# Patient Record
Sex: FEMALE | Race: BLACK OR AFRICAN AMERICAN | NOT HISPANIC OR LATINO
[De-identification: names, ages, dates, MRNs, and addresses within clinical notes are randomized per-mention and may not be internally consistent; named-entity substitution may affect disease eponyms.]

---

## 2017-03-28 ENCOUNTER — APPOINTMENT (OUTPATIENT)
Dept: PULMONOLOGY | Facility: CLINIC | Age: 57
End: 2017-03-28

## 2018-11-08 ENCOUNTER — APPOINTMENT (OUTPATIENT)
Dept: PULMONOLOGY | Facility: CLINIC | Age: 58
End: 2018-11-08
Payer: MEDICARE

## 2018-11-08 VITALS
BODY MASS INDEX: 23.03 KG/M2 | DIASTOLIC BLOOD PRESSURE: 78 MMHG | HEART RATE: 99 BPM | OXYGEN SATURATION: 89 % | SYSTOLIC BLOOD PRESSURE: 146 MMHG | WEIGHT: 130 LBS

## 2018-11-08 PROCEDURE — 99214 OFFICE O/P EST MOD 30 MIN: CPT

## 2018-11-27 ENCOUNTER — APPOINTMENT (OUTPATIENT)
Dept: PULMONOLOGY | Facility: CLINIC | Age: 58
End: 2018-11-27
Payer: MEDICARE

## 2018-11-27 VITALS
HEART RATE: 84 BPM | SYSTOLIC BLOOD PRESSURE: 118 MMHG | OXYGEN SATURATION: 90 % | WEIGHT: 132 LBS | DIASTOLIC BLOOD PRESSURE: 86 MMHG | BODY MASS INDEX: 23.38 KG/M2

## 2018-11-27 DIAGNOSIS — R94.2 ABNORMAL RESULTS OF PULMONARY FUNCTION STUDIES: ICD-10-CM

## 2018-11-27 PROCEDURE — 94729 DIFFUSING CAPACITY: CPT

## 2018-11-27 PROCEDURE — 94727 GAS DIL/WSHOT DETER LNG VOL: CPT

## 2018-11-27 PROCEDURE — 94060 EVALUATION OF WHEEZING: CPT

## 2018-11-27 PROCEDURE — 99214 OFFICE O/P EST MOD 30 MIN: CPT | Mod: 25

## 2018-12-02 PROBLEM — R94.2 DECREASED DIFFUSION CAPACITY OF LUNG: Status: ACTIVE | Noted: 2018-12-02

## 2019-02-26 ENCOUNTER — APPOINTMENT (OUTPATIENT)
Dept: PULMONOLOGY | Facility: CLINIC | Age: 59
End: 2019-02-26

## 2020-04-08 ENCOUNTER — APPOINTMENT (OUTPATIENT)
Dept: PULMONOLOGY | Facility: CLINIC | Age: 60
End: 2020-04-08
Payer: MEDICAID

## 2020-04-08 VITALS
WEIGHT: 137 LBS | TEMPERATURE: 98.4 F | DIASTOLIC BLOOD PRESSURE: 90 MMHG | BODY MASS INDEX: 24.27 KG/M2 | OXYGEN SATURATION: 96 % | SYSTOLIC BLOOD PRESSURE: 148 MMHG | HEART RATE: 82 BPM

## 2020-04-08 PROCEDURE — 94060 EVALUATION OF WHEEZING: CPT

## 2020-04-08 PROCEDURE — 99215 OFFICE O/P EST HI 40 MIN: CPT | Mod: 25

## 2020-04-08 PROCEDURE — 94727 GAS DIL/WSHOT DETER LNG VOL: CPT

## 2020-04-08 PROCEDURE — 94729 DIFFUSING CAPACITY: CPT

## 2020-04-08 RX ORDER — BUDESONIDE 0.25 MG/2ML
0.25 INHALANT ORAL
Refills: 0 | Status: ACTIVE | COMMUNITY

## 2020-04-08 RX ORDER — MONTELUKAST 10 MG/1
10 TABLET, FILM COATED ORAL
Refills: 0 | Status: ACTIVE | COMMUNITY

## 2020-04-08 RX ORDER — IPRATROPIUM BROMIDE AND ALBUTEROL SULFATE 2.5; .5 MG/3ML; MG/3ML
0.5-2.5 (3) SOLUTION RESPIRATORY (INHALATION)
Refills: 0 | Status: ACTIVE | COMMUNITY

## 2020-04-08 NOTE — PROCEDURE
[FreeTextEntry1] : Cardiac cath 3/4/15 - negative for CAD ([facility]). EF 55%. \par \par Echocardiogram - 3/27/15: Mild to moderate LV dysfunction. EF of 45%. Hypokinetic infero-lateral wall. Normal RV size and function. \par \par PFT 11/27/18:  Mixed obstructive/restrictive lung disease with a bronchodilator response. Diffusion is decreased.\par \par PFT - 4/8/20: Severe obstruction. Moderate restriction. Moderate reduction in diffusion. Mild response to bronchodilator. \par \par Chest x-ray - 12/29/19: [facility]. Increased markings bilaterally. No effusion. Heart size was normal.

## 2020-04-08 NOTE — HISTORY OF PRESENT ILLNESS
[Former] : former [TextBox_15] : 2000 [FreeTextEntry1] : Presented with dyspnea on exertion, cough and chest congestion. No chest pain, pressure or palpitation. No fever, chills or sweats. No sick contacts. \par \sigrid Has been using her nebulizer twice a day. Uses DuoNeb and budesonide. Also has been on Symbicort. \par \par She was in [facility] in January. \par \sigrid Stopped smoking in 2000.

## 2020-04-08 NOTE — DISCUSSION/SUMMARY
[FreeTextEntry1] : She is a 59 year old, former smoker, with a history of hypertension, cardiomyopathy and severe COPD. PFT shows combined obstructive and restrictive lung disease. Has not had CT of the Chest as of yet. \par \par Her COPD is not well controlled. Given short burst of prednisone. Placed on Trelegy. Continue with nebulizer for now. Chest CT requested to R/O and ILD. She should follow up with Cardiology. \par \par Follow up in in one month.

## 2020-04-08 NOTE — PHYSICAL EXAM
[No Acute Distress] : no acute distress [No Neck Mass] : no neck mass [Normal S1, S2] : normal s1, s2 [Rhonchi] : rhonchi [No HSM] : no hsm [Normal Gait] : normal gait [No Clubbing] : no clubbing [No Edema] : no edema [Normal Turgor] : normal turgor [No Focal Deficits] : no focal deficits [Oriented x3] : oriented x3 [Normal Affect] : normal affect

## 2020-04-08 NOTE — REVIEW OF SYSTEMS
[Cough] : cough [Wheezing] : wheezing [SOB on Exertion] : sob on exertion [Fever] : no fever [Fatigue] : no fatigue [Chills] : no chills [Nasal Congestion] : no nasal congestion [Hemoptysis] : no hemoptysis [Sputum] : no sputum [Chest Discomfort] : no chest discomfort [Edema] : no edema [Palpitations] : no palpitations [Nasal Discharge] : no nasal discharge [GERD] : no gerd [Myalgias] : no myalgias [Anemia] : no anemia [Headache] : no headache [Depression] : no depression [Diabetes] : no diabetes [Thyroid Problem] : no thyroid problem

## 2020-04-17 ENCOUNTER — APPOINTMENT (OUTPATIENT)
Dept: PULMONOLOGY | Facility: CLINIC | Age: 60
End: 2020-04-17

## 2020-05-13 ENCOUNTER — APPOINTMENT (OUTPATIENT)
Dept: PULMONOLOGY | Facility: CLINIC | Age: 60
End: 2020-05-13
Payer: MEDICAID

## 2020-05-13 VITALS — HEART RATE: 81 BPM | OXYGEN SATURATION: 97 %

## 2020-05-13 VITALS
WEIGHT: 147 LBS | TEMPERATURE: 98.5 F | SYSTOLIC BLOOD PRESSURE: 146 MMHG | DIASTOLIC BLOOD PRESSURE: 88 MMHG | OXYGEN SATURATION: 91 % | BODY MASS INDEX: 26.04 KG/M2 | HEART RATE: 87 BPM

## 2020-05-13 PROCEDURE — 99214 OFFICE O/P EST MOD 30 MIN: CPT

## 2020-05-13 RX ORDER — PREDNISONE 20 MG/1
20 TABLET ORAL DAILY
Qty: 5 | Refills: 1 | Status: DISCONTINUED | COMMUNITY
Start: 2020-04-08 | End: 2020-05-13

## 2020-05-13 NOTE — PHYSICAL EXAM
[No Neck Mass] : no neck mass [No Acute Distress] : no acute distress [Normal S1, S2] : normal s1, s2 [No HSM] : no hsm [Rhonchi] : rhonchi [Normal Gait] : normal gait [No Clubbing] : no clubbing [No Edema] : no edema [Normal Turgor] : normal turgor [No Focal Deficits] : no focal deficits [Oriented x3] : oriented x3 [Normal Affect] : normal affect [No Resp Distress] : no resp distress [No Acc Muscle Use] : no acc muscle use [TextBox_68] : Rhonchi and scattered wheezes

## 2020-05-13 NOTE — HISTORY OF PRESENT ILLNESS
[Former] : former [YearQuit] : 2000 [FreeTextEntry1] : The patient came for a follow up visit today. \par \par Feels congested. The Trelegy makes her breath harder. Off Symbicort. Ran out of albuterol. Had Ventolin. \par \par She was in [facility] in January. \par \par Stopped smoking in 2000.

## 2020-05-13 NOTE — PROCEDURE
[FreeTextEntry1] : Cardiac cath 3/4/15 - negative for CAD ([facility]). EF 55%. \par \par Echocardiogram - 3/27/15: Mild to moderate LV dysfunction. EF of 45%. Hypokinetic infero-lateral wall. Normal RV size and function. \par \par PFT 11/27/18:  Mixed obstructive/restrictive lung disease with a bronchodilator response. Diffusion is decreased.\par \par PFT - 4/8/20: Severe obstruction. Moderate restriction. Moderate reduction in diffusion. Mild response to bronchodilator. \par \par Chest x-ray - 12/29/19: [place] [facility] [place] Increased markings bilaterally. No effusion. Heart size was normal.\par \par CT Chest - 4/24/20: Granulomatous disease. Moderate pulmonary scatting with traction bronchiectasis. Hepatic granulomas as well.

## 2020-05-13 NOTE — DISCUSSION/SUMMARY
[FreeTextEntry1] : She is a 60 year old, former smoker, with a history of hypertension, cardiomyopathy and severe COPD. PFT shows combined obstructive and restrictive lung disease. CT 4/24/20 showed granulomatous disease with scarring and bronchiectasis. \par \par Her COPD is not well controlled. She is still active and she is still wheezing. Given prednisone and another course of antibiotics. \par \par To continue with Trelegy and albuterol as needed. Albuterol renewed. \par \par Follow up with Dr. Araya in one month.

## 2020-05-13 NOTE — REVIEW OF SYSTEMS
[Cough] : cough [Wheezing] : wheezing [SOB on Exertion] : sob on exertion [Fever] : no fever [Fatigue] : no fatigue [Nasal Congestion] : no nasal congestion [Hemoptysis] : no hemoptysis [Chest Tightness] : no chest tightness [Sputum] : no sputum [Chest Discomfort] : no chest discomfort [Edema] : no edema [Palpitations] : no palpitations [Nasal Discharge] : no nasal discharge [GERD] : no gerd [Myalgias] : no myalgias [Anemia] : no anemia [Headache] : no headache [Depression] : no depression [Diabetes] : no diabetes [Thyroid Problem] : no thyroid problem

## 2020-06-10 ENCOUNTER — APPOINTMENT (OUTPATIENT)
Dept: PULMONOLOGY | Facility: CLINIC | Age: 60
End: 2020-06-10
Payer: MEDICARE

## 2020-06-10 VITALS
SYSTOLIC BLOOD PRESSURE: 150 MMHG | TEMPERATURE: 98 F | WEIGHT: 148 LBS | DIASTOLIC BLOOD PRESSURE: 86 MMHG | OXYGEN SATURATION: 95 % | HEART RATE: 83 BPM | BODY MASS INDEX: 26.22 KG/M2

## 2020-06-10 PROCEDURE — 99214 OFFICE O/P EST MOD 30 MIN: CPT

## 2020-06-10 RX ORDER — AZITHROMYCIN 250 MG/1
250 TABLET, FILM COATED ORAL
Qty: 1 | Refills: 1 | Status: COMPLETED | COMMUNITY
Start: 2020-05-13 | End: 2020-06-10

## 2020-06-10 RX ORDER — PREDNISONE 10 MG/1
10 TABLET ORAL
Qty: 60 | Refills: 1 | Status: COMPLETED | COMMUNITY
Start: 2020-05-13 | End: 2020-06-10

## 2020-06-10 NOTE — PROCEDURE
[FreeTextEntry1] : Cardiac cath 3/4/15 - negative for CAD ([facility]). EF 55%. \par \par Echocardiogram - 3/27/15: Mild to moderate LV dysfunction. EF of 45%. Hypokinetic infero-lateral wall. Normal RV size and function. \par \par PFT 11/27/18:  Mixed obstructive/restrictive lung disease with a bronchodilator response. Diffusion is decreased.\par \par PFT - 4/8/20: Severe obstruction. Moderate restriction. Moderate reduction in diffusion. Mild response to bronchodilator. \par \par Chest x-ray - 12/29/19: Done at [facility] [place]. Increased markings bilaterally. No effusion. Heart size was normal.\par \par CT Chest - 4/24/20: Granulomatous disease. Moderate pulmonary scatting with traction bronchiectasis. Hepatic granulomas as well.

## 2020-06-10 NOTE — DISCUSSION/SUMMARY
[FreeTextEntry1] : She is a 60 year old, former smoker, with a history of hypertension, cardiomyopathy and severe COPD. PFT showed combined obstructive and restrictive lung disease. CT 4/24/20 showed granulomatous disease with scarring and bronchiectasis possibly due to Sarcoid.\par \par She is now stable. Advised to continue with Trelegy and albuterol as needed.\par \par Will review hospital records. \par \par Follow up in three months. Sooner if necessary. \par

## 2020-06-10 NOTE — HISTORY OF PRESENT ILLNESS
[Former] : former [YearQuit] : 2000 [FreeTextEntry1] : She was in [facility] a few weeks ago for edema of the legs. \par \par Feels better now. \par \par On Trelegy and Ventolin. \par \par She was in [facility] in January. \par \par Stopped smoking in 2000.

## 2020-06-10 NOTE — REVIEW OF SYSTEMS
[Cough] : cough [Wheezing] : wheezing [SOB on Exertion] : sob on exertion [Fever] : no fever [Fatigue] : no fatigue [Hemoptysis] : no hemoptysis [Nasal Congestion] : no nasal congestion [Sputum] : no sputum [Chest Tightness] : no chest tightness [Chest Discomfort] : no chest discomfort [Palpitations] : no palpitations [Edema] : no edema [Nasal Discharge] : no nasal discharge [GERD] : no gerd [Myalgias] : no myalgias [Anemia] : no anemia [Depression] : no depression [Headache] : no headache [Thyroid Problem] : no thyroid problem [Diabetes] : no diabetes

## 2020-06-10 NOTE — PHYSICAL EXAM
[No Acute Distress] : no acute distress [No Neck Mass] : no neck mass [Normal S1, S2] : normal s1, s2 [No Resp Distress] : no resp distress [No Acc Muscle Use] : no acc muscle use [Rhonchi] : rhonchi [No HSM] : no hsm [Normal Gait] : normal gait [No Clubbing] : no clubbing [No Edema] : no edema [Normal Turgor] : normal turgor [No Focal Deficits] : no focal deficits [Oriented x3] : oriented x3 [Normal Affect] : normal affect [TextBox_68] : Rhonchi and scattered wheezes

## 2020-09-11 ENCOUNTER — APPOINTMENT (OUTPATIENT)
Dept: PULMONOLOGY | Facility: CLINIC | Age: 60
End: 2020-09-11

## 2021-11-15 ENCOUNTER — APPOINTMENT (OUTPATIENT)
Dept: PULMONOLOGY | Facility: CLINIC | Age: 61
End: 2021-11-15
Payer: MEDICARE

## 2021-11-15 VITALS
DIASTOLIC BLOOD PRESSURE: 80 MMHG | SYSTOLIC BLOOD PRESSURE: 134 MMHG | HEART RATE: 80 BPM | WEIGHT: 135 LBS | BODY MASS INDEX: 23.92 KG/M2 | HEIGHT: 63 IN | TEMPERATURE: 97.5 F | OXYGEN SATURATION: 96 %

## 2021-11-15 DIAGNOSIS — Z11.59 ENCOUNTER FOR SCREENING FOR OTHER VIRAL DISEASES: ICD-10-CM

## 2021-11-15 PROCEDURE — 99213 OFFICE O/P EST LOW 20 MIN: CPT

## 2021-11-15 RX ORDER — ALBUTEROL SULFATE 90 UG/1
108 (90 BASE) INHALANT RESPIRATORY (INHALATION)
Qty: 1 | Refills: 5 | Status: ACTIVE | COMMUNITY
Start: 2020-05-13 | End: 1900-01-01

## 2021-11-15 NOTE — PHYSICAL EXAM
[No Acute Distress] : no acute distress [No Neck Mass] : no neck mass [Normal S1, S2] : normal s1, s2 [No Resp Distress] : no resp distress [No Acc Muscle Use] : no acc muscle use [No HSM] : no hsm [Normal Gait] : normal gait [No Edema] : no edema [Normal Turgor] : normal turgor [No Focal Deficits] : no focal deficits [Oriented x3] : oriented x3 [Clear to Auscultation Bilaterally] : clear to auscultation bilaterally [No Cyanosis] : no cyanosis

## 2021-11-15 NOTE — HISTORY OF PRESENT ILLNESS
[Former] : former [TextBox_4] : She was in [facility] a few weeks ago. Does not know the details of her admission and did not bring any documents with her. \par \par C/O LEE. Relieved by rest. Uses albuterol as needed which helps her. Stopped Trelegy. It did not help her. \par \par Said she was told that she may need back surgery.\par \par No fever, chills or sweats. \par \par No sick contacts. \par \par \par  [YearQuit] : 2000

## 2021-11-15 NOTE — REVIEW OF SYSTEMS
[Cough] : cough [Wheezing] : wheezing [SOB on Exertion] : sob on exertion [Fever] : no fever [Chills] : no chills [Nasal Congestion] : no nasal congestion [Hemoptysis] : no hemoptysis [Chest Tightness] : no chest tightness [Sputum] : no sputum [Chest Discomfort] : no chest discomfort [Edema] : no edema [Palpitations] : no palpitations [Nasal Discharge] : no nasal discharge [GERD] : no gerd [Myalgias] : no myalgias [Rash] : no rash [Anemia] : no anemia [Headache] : no headache [Depression] : no depression [Diabetes] : no diabetes [Thyroid Problem] : no thyroid problem

## 2021-11-15 NOTE — PROCEDURE
[FreeTextEntry1] : Cardiac cath 3/4/15 - negative for CAD ([facility]). EF 55%. \par \par Echocardiogram - 3/27/15: Mild to moderate LV dysfunction. EF of 45%. Hypokinetic infero-lateral wall. Normal RV size and function. \par \par PFT 11/27/18:  Mixed obstructive/restrictive lung disease with a bronchodilator response. Diffusion is decreased.\par \par PFT 4/8/20:  Severe obstruction. Moderate restriction. Moderate reduction in diffusion. Mild response to bronchodilator. \par \par Chest x[place] 12/29/19: [place] [facility] [place] bilaterally. No effusion. Heart size was normal.\par \par CT Chest 4/24/20: Granulomatous disease. Moderate pulmonary scatting with traction bronchiectasis. Hepatic granulomas as well.

## 2021-11-15 NOTE — DISCUSSION/SUMMARY
[FreeTextEntry1] : She is a 61 year old, former smoker, with a history of hypertension, cardiomyopathy and severe COPD. PFT showed combined obstructive and restrictive lung disease. CT 4/24/20 showed granulomatous disease with scarring and bronchiectasis possibly due to Sarcoid.\par \par For her COPD she is to continue with albuterol as needed for now. A PFT will be obtained after a nasopharyngeal swab for COVID 19 PCR has been obtained and the result is negative. Will adjust the therapy accordingly. \par \par Will review the records from her recent  stay. \par \par \par

## 2021-11-17 ENCOUNTER — APPOINTMENT (OUTPATIENT)
Dept: SPINE | Facility: CLINIC | Age: 61
End: 2021-11-17

## 2021-12-10 ENCOUNTER — APPOINTMENT (OUTPATIENT)
Dept: PULMONOLOGY | Facility: CLINIC | Age: 61
End: 2021-12-10

## 2022-04-11 PROBLEM — Z11.59 SCREENING FOR VIRAL DISEASE: Status: ACTIVE | Noted: 2021-11-15

## 2022-08-04 ENCOUNTER — APPOINTMENT (OUTPATIENT)
Dept: PULMONOLOGY | Facility: CLINIC | Age: 62
End: 2022-08-04
Payer: MEDICARE

## 2022-08-04 VITALS
SYSTOLIC BLOOD PRESSURE: 152 MMHG | TEMPERATURE: 97.3 F | HEART RATE: 90 BPM | BODY MASS INDEX: 25.33 KG/M2 | WEIGHT: 143 LBS | OXYGEN SATURATION: 88 % | DIASTOLIC BLOOD PRESSURE: 88 MMHG

## 2022-08-04 PROCEDURE — 99214 OFFICE O/P EST MOD 30 MIN: CPT | Mod: 25

## 2022-08-04 PROCEDURE — 94060 EVALUATION OF WHEEZING: CPT

## 2022-08-04 PROCEDURE — 94727 GAS DIL/WSHOT DETER LNG VOL: CPT

## 2022-08-04 PROCEDURE — 94729 DIFFUSING CAPACITY: CPT

## 2022-08-04 NOTE — PROCEDURE
[FreeTextEntry1] : Cardiac cath 3/4/15 - negative for CAD ([facility]). EF 55%. \par \par Echocardiogram - 3/27/15: Mild to moderate LV dysfunction. EF of 45%. Hypokinetic infero-lateral wall. Normal RV size and function. \par \par PFT 11/27/18:  Mixed obstructive/restrictive lung disease with a bronchodilator response. Diffusion is decreased.\par \par PFT 4/8/20:  Severe obstruction. Moderate restriction. Moderate reduction in diffusion. Mild response to bronchodilator. \par \par PFT 8/4/22: Severe obstruction. Moderate restriction and moderate reduction in diffusion. No significant improvement after bronchodilator. \par \par Chest x-ray 12/29/19: Done at [facility] [place]. Increased markings bilaterally. No effusion. Heart size was normal.\par \par CT Chest 4/24/20: Granulomatous disease. Moderate pulmonary scatting with traction bronchiectasis. Hepatic granulomas as well.

## 2022-08-04 NOTE — PHYSICAL EXAM
[No Acute Distress] : no acute distress [No Neck Mass] : no neck mass [Normal S1, S2] : normal s1, s2 [No HSM] : no hsm [Normal Gait] : normal gait [No Cyanosis] : no cyanosis [No Edema] : no edema [Normal Turgor] : normal turgor [No Focal Deficits] : no focal deficits [Oriented x3] : oriented x3 [No Resp Distress] : no resp distress [Rhonchi] : rhonchi

## 2022-08-04 NOTE — DISCUSSION/SUMMARY
[FreeTextEntry1] : She is a 62 year old, former smoker, with a history of hypertension, cardiomyopathy and severe COPD. PFT showed combined obstructive and restrictive lung disease. CT 4/24/20 showed granulomatous disease with scarring and bronchiectasis possibly due to Sarcoid.\par \par She has severe COPD. Will add Breztri and to continue with albuterol as needed. \par \par Follow up in one month. Sooner if necessary.

## 2022-08-04 NOTE — REVIEW OF SYSTEMS
[Cough] : cough [SOB on Exertion] : sob on exertion [Fever] : no fever [Nasal Congestion] : no nasal congestion [Hemoptysis] : no hemoptysis [Sputum] : no sputum [Chest Discomfort] : no chest discomfort [Edema] : no edema [Palpitations] : no palpitations [Nasal Discharge] : no nasal discharge [GERD] : no gerd [Myalgias] : no myalgias [Rash] : no rash [Anemia] : no anemia [Headache] : no headache [Depression] : no depression [Diabetes] : no diabetes [Thyroid Problem] : no thyroid problem

## 2023-03-06 ENCOUNTER — APPOINTMENT (OUTPATIENT)
Dept: PULMONOLOGY | Facility: CLINIC | Age: 63
End: 2023-03-06
Payer: MEDICARE

## 2023-03-06 VITALS
SYSTOLIC BLOOD PRESSURE: 144 MMHG | WEIGHT: 143 LBS | TEMPERATURE: 96.6 F | HEART RATE: 76 BPM | OXYGEN SATURATION: 94 % | BODY MASS INDEX: 25.34 KG/M2 | HEIGHT: 63 IN | DIASTOLIC BLOOD PRESSURE: 70 MMHG

## 2023-03-06 PROCEDURE — 99214 OFFICE O/P EST MOD 30 MIN: CPT

## 2023-03-06 NOTE — PHYSICAL EXAM
[No Acute Distress] : no acute distress [No Neck Mass] : no neck mass [Normal S1, S2] : normal s1, s2 [No Resp Distress] : no resp distress [No HSM] : no hsm [Normal Gait] : normal gait [No Cyanosis] : no cyanosis [No Edema] : no edema [Normal Turgor] : normal turgor [No Focal Deficits] : no focal deficits [Oriented x3] : oriented x3 [Normal Oropharynx] : normal oropharynx [Normal Appearance] : normal appearance [Normal Rate/Rhythm] : normal rate/rhythm [Clear to Auscultation Bilaterally] : clear to auscultation bilaterally [Benign] : benign [Normal Affect] : normal affect

## 2023-03-06 NOTE — REVIEW OF SYSTEMS
[Cough] : cough [SOB on Exertion] : sob on exertion [Wheezing] : wheezing [Fatigue] : no fatigue [Nasal Congestion] : no nasal congestion [Hemoptysis] : no hemoptysis [Chest Tightness] : no chest tightness [Chest Discomfort] : no chest discomfort [Edema] : no edema [Palpitations] : no palpitations [Nasal Discharge] : no nasal discharge [GERD] : no gerd [Myalgias] : no myalgias [Rash] : no rash [Anemia] : no anemia [Headache] : no headache [Depression] : no depression [Diabetes] : no diabetes [Thyroid Problem] : no thyroid problem

## 2023-03-06 NOTE — PROCEDURE
[FreeTextEntry1] : Cardiac cath 3/4/15: negative for CAD ([facility]). EF 55%. \par \par Echocardiogram 3/27/15: Mild to moderate LV dysfunction. EF of 45%. Hypokinetic infero-lateral wall. Normal RV size and function. \par \par PFT 11/27/18:  Mixed obstructive/restrictive lung disease with a bronchodilator response. Diffusion is decreased.\par \par PFT 4/8/20:  Severe obstruction. Moderate restriction. Moderate reduction in diffusion. Mild response to bronchodilator. \par \par PFT 8/4/22: Severe obstruction. Moderate restriction and moderate reduction in diffusion. No significant improvement after bronchodilator. \par \par [place]29[place]19[place] [facility] [place] bil[place]. No effusion. Heart size was normal.\par \par CT Chest 4/24/20: Granulomatous disease. Moderate pulmonary scatting with traction bronchiectasis. Hepatic granulomas as well.

## 2023-03-06 NOTE — DISCUSSION/SUMMARY
[FreeTextEntry1] : She is a 62 year old, former smoker, with a history of hypertension, cardiomyopathy and severe COPD. PFT showed combined obstructive and restrictive lung disease. CT 4/24/20 showed granulomatous disease with scarring and bronchiectasis possibly due to Sarcoid.\par \par Impression: \par Severe COPD\par History of Sarcoid\par Abnormal Chest CT \par \par Plan: \par To continue with Breztri and albuterol as needed. \par - given spacer to use\par CT Chest now\par Follow up in one month\par

## 2023-03-06 NOTE — HISTORY OF PRESENT ILLNESS
[Former] : former [TextBox_4] : Feels better but still wheezing on occasion. \par \par Has been on Breztri.  [YearQuit] : 2000 [Awakes Unrefreshed] : does not awaken unrefreshed [Difficulty Maintaining Sleep] : does not have difficulty maintaining sleep

## 2023-03-09 ENCOUNTER — NON-APPOINTMENT (OUTPATIENT)
Age: 63
End: 2023-03-09

## 2023-05-25 ENCOUNTER — APPOINTMENT (OUTPATIENT)
Dept: PULMONOLOGY | Facility: CLINIC | Age: 63
End: 2023-05-25

## 2023-05-26 ENCOUNTER — APPOINTMENT (OUTPATIENT)
Dept: PULMONOLOGY | Facility: CLINIC | Age: 63
End: 2023-05-26
Payer: MEDICARE

## 2023-05-26 VITALS
BODY MASS INDEX: 25.15 KG/M2 | OXYGEN SATURATION: 95 % | WEIGHT: 142 LBS | TEMPERATURE: 97.3 F | DIASTOLIC BLOOD PRESSURE: 82 MMHG | HEART RATE: 79 BPM | SYSTOLIC BLOOD PRESSURE: 150 MMHG

## 2023-05-26 PROCEDURE — 99214 OFFICE O/P EST MOD 30 MIN: CPT

## 2023-05-26 RX ORDER — MONTELUKAST 10 MG/1
10 TABLET, FILM COATED ORAL
Qty: 1 | Refills: 1 | Status: ACTIVE | COMMUNITY
Start: 2023-05-26 | End: 1900-01-01

## 2023-05-26 NOTE — PHYSICAL EXAM
[No Acute Distress] : no acute distress [Normal Appearance] : normal appearance [No Neck Mass] : no neck mass [Normal Rate/Rhythm] : normal rate/rhythm [Normal S1, S2] : normal s1, s2 [No Resp Distress] : no resp distress [Benign] : benign [No HSM] : no hsm [Normal Gait] : normal gait [No Cyanosis] : no cyanosis [No Edema] : no edema [Normal Turgor] : normal turgor [No Focal Deficits] : no focal deficits [Oriented x3] : oriented x3 [Normal Affect] : normal affect [No Acc Muscle Use] : no acc muscle use [Wheeze] : wheeze [Rhonchi] : rhonchi

## 2023-05-26 NOTE — REVIEW OF SYSTEMS
[Cough] : cough [Wheezing] : wheezing [SOB on Exertion] : sob on exertion [Fever] : no fever [Fatigue] : no fatigue [Nasal Congestion] : no nasal congestion [Hemoptysis] : no hemoptysis [Chest Tightness] : no chest tightness [Sputum] : no sputum [Pleuritic Pain] : no pleuritic pain [Chest Discomfort] : no chest discomfort [Edema] : no edema [Palpitations] : no palpitations [Nasal Discharge] : no nasal discharge [GERD] : no gerd [Myalgias] : no myalgias [Rash] : no rash [Anemia] : no anemia [Headache] : no headache [Depression] : no depression [Diabetes] : no diabetes [Thyroid Problem] : no thyroid problem

## 2023-05-26 NOTE — PROCEDURE
[FreeTextEntry1] : Cardiac cath 3/4/15: negative for CAD ([facility]). EF 55%. \par \par Echocardiogram 3/27/15: Mild to moderate LV dysfunction. EF of 45%. Hypokinetic infero-lateral wall. Normal RV size and function. \par \par PFT 11/27/18:  Mixed obstructive/restrictive lung disease with a bronchodilator response. Diffusion is decreased.\par \par PFT 4/8/20:  Severe obstruction. Moderate restriction. Moderate reduction in diffusion. Mild response to bronchodilator. \par \par PFT 8/4/22: Severe obstruction. Moderate restriction and moderate reduction in diffusion. No significant improvement after bronchodilator. \par \par [place]29/19: [place] [facility] [place] bilaterally. No effusion. Heart size was normal.\par \par CT Chest 4/24/20: Granulomatous disease. Moderate pulmonary scatting with traction bronchiectasis. Hepatic granulomas as well. \par \par CT Chest 1/28/23: Moderate pulmonary scarring and mild bronchiectasis.  Calcified granulomata and adenopathy.

## 2023-05-26 NOTE — HISTORY OF PRESENT ILLNESS
[Former] : former [TextBox_4] : Was having coughing and wheezing. It got better on its own but not resolved. Goes outdoors and walks slowly. \par \par On albuterol as needed. Not using Trelegy or Breztri. They mad her "breathe harder." \par \par No fever, chills or sweats. \par  [YearQuit] : 2000 [Awakes Unrefreshed] : does not awaken unrefreshed [Difficulty Maintaining Sleep] : difficulty maintaining sleep

## 2023-05-26 NOTE — DISCUSSION/SUMMARY
[FreeTextEntry1] : She is a 63 year old, former smoker, with a history of hypertension, cardiomyopathy and severe COPD. PFT showed combined obstructive and restrictive lung disease. CT 4/24/20 showed granulomatous disease with scarring and bronchiectasis possibly due to Sarcoid.CT Chest 1/28/23: Moderate pulmonary scarring and mild bronchiectasis.  Calcified granulomata and adenopathy.\par \par Impression: \par Severe COPD\par - mild exacerbation\par - wheezing on exam \par History of Sarcoid\par Scarring on chest CT as noted above\par \par Plan: \par To continue with albuterol as needed. \par Will try montelukast \par To call me if not feeling better\par Follow up in three months\par - sooner if necessary \par \par

## 2023-06-14 ENCOUNTER — APPOINTMENT (OUTPATIENT)
Dept: PULMONOLOGY | Facility: CLINIC | Age: 63
End: 2023-06-14

## 2023-12-27 ENCOUNTER — APPOINTMENT (OUTPATIENT)
Dept: PULMONOLOGY | Facility: CLINIC | Age: 63
End: 2023-12-27
Payer: MEDICARE

## 2023-12-27 VITALS
HEART RATE: 87 BPM | SYSTOLIC BLOOD PRESSURE: 148 MMHG | WEIGHT: 139 LBS | TEMPERATURE: 96.6 F | BODY MASS INDEX: 24.62 KG/M2 | DIASTOLIC BLOOD PRESSURE: 94 MMHG | OXYGEN SATURATION: 96 %

## 2023-12-27 DIAGNOSIS — R93.89 ABNORMAL FINDINGS ON DIAGNOSTIC IMAGING OF OTHER SPECIFIED BODY STRUCTURES: ICD-10-CM

## 2023-12-27 DIAGNOSIS — R06.02 SHORTNESS OF BREATH: ICD-10-CM

## 2023-12-27 PROCEDURE — 94727 GAS DIL/WSHOT DETER LNG VOL: CPT

## 2023-12-27 PROCEDURE — 94060 EVALUATION OF WHEEZING: CPT

## 2023-12-27 PROCEDURE — 94729 DIFFUSING CAPACITY: CPT

## 2023-12-27 PROCEDURE — 99214 OFFICE O/P EST MOD 30 MIN: CPT | Mod: 25

## 2023-12-27 RX ORDER — BUDESONIDE, GLYCOPYRROLATE, AND FORMOTEROL FUMARATE 160; 9; 4.8 UG/1; UG/1; UG/1
160-9-4.8 AEROSOL, METERED RESPIRATORY (INHALATION)
Qty: 1 | Refills: 5 | Status: ACTIVE | COMMUNITY
Start: 2023-12-27 | End: 1900-01-01

## 2023-12-27 NOTE — DISCUSSION/SUMMARY
[FreeTextEntry1] : She is a 63 year old, former smoker (stopped in 2000), with a history of hypertension, cardiomyopathy and severe COPD. PFT showed combined obstructive and restrictive lung disease. CT 4/24/20 showed granulomatous disease with scarring and bronchiectasis possibly due to Sarcoid. CT Chest 1/28/23: Moderate pulmonary scarring and mild bronchiectasis.  Calcified granulomata and adenopathy.  Impression:  Severe COPD History of Sarcoid - scarring on chest CT  Former smoker  Plan:  Placed on Breztri . albuterol as needed  Continue with montelukast  Follow up in 6 months - sooner if necessary

## 2023-12-27 NOTE — PROCEDURE
[FreeTextEntry1] : Cardiac cath 3/4/15: negative for CAD ([facility]). EF 55%.   Echocardiogram 3/27/15: Mild to moderate LV dysfunction. EF of 45%. Hypokinetic infero-lateral wall. Normal RV size and function.   PFT 11/27/18:  Mixed obstructive/restrictive lung disease with a bronchodilator response. Diffusion is decreased.  PFT 4/8/20:  Severe obstruction. Moderate restriction. Moderate reduction in diffusion. Mild response to bronchodilator.   PFT 8/4/22: Severe obstruction. Moderate restriction and moderate reduction in diffusion. No significant improvement after bronchodilator.   PFT 12/27/23: Severe obstruction.  Moderate restriction.  Moderate reduction in diffusion.  Chest x-ray 12/29/19: Done at [facility] [place]. Increased markings bilaterally. No effusion. Heart size was normal.  CT Chest 4/24/20: Granulomatous disease. Moderate pulmonary scatting with traction bronchiectasis. Hepatic granulomas as well.   CT Chest 1/28/23: Moderate pulmonary scarring and mild bronchiectasis.  Calcified granulomata and adenopathy.

## 2023-12-27 NOTE — PHYSICAL EXAM
[No Acute Distress] : no acute distress [Normal Appearance] : normal appearance [Normal Rate/Rhythm] : normal rate/rhythm [Normal S1, S2] : normal s1, s2 [No Resp Distress] : no resp distress [Rhonchi] : rhonchi [Benign] : benign [No HSM] : no hsm [Normal Gait] : normal gait [No Cyanosis] : no cyanosis [No Edema] : no edema [Normal Turgor] : normal turgor [No Focal Deficits] : no focal deficits [Oriented x3] : oriented x3 [Normal Affect] : normal affect

## 2023-12-27 NOTE — HISTORY OF PRESENT ILLNESS
[Former] : former [TextBox_4] : She came for a follow-up today.  She complains of dyspnea on exertion, relieved by rest.  No chest pain, pressure or palpitations.  She has been on albuterol.  She does not have any other inhalers. [YearQuit] : 2000 [Awakes Unrefreshed] : does not awaken unrefreshed [Difficulty Maintaining Sleep] : does not have difficulty maintaining sleep

## 2024-01-26 ENCOUNTER — APPOINTMENT (OUTPATIENT)
Dept: PULMONOLOGY | Facility: CLINIC | Age: 64
End: 2024-01-26

## 2024-01-31 ENCOUNTER — APPOINTMENT (OUTPATIENT)
Dept: PULMONOLOGY | Facility: CLINIC | Age: 64
End: 2024-01-31
Payer: MEDICARE

## 2024-01-31 VITALS
DIASTOLIC BLOOD PRESSURE: 78 MMHG | OXYGEN SATURATION: 93 % | SYSTOLIC BLOOD PRESSURE: 148 MMHG | WEIGHT: 142 LBS | BODY MASS INDEX: 25.15 KG/M2 | TEMPERATURE: 97.1 F | HEART RATE: 90 BPM

## 2024-01-31 DIAGNOSIS — J44.9 CHRONIC OBSTRUCTIVE PULMONARY DISEASE, UNSPECIFIED: ICD-10-CM

## 2024-01-31 PROCEDURE — 99214 OFFICE O/P EST MOD 30 MIN: CPT

## 2024-01-31 RX ORDER — FLUTICASONE FUROATE, UMECLIDINIUM BROMIDE AND VILANTEROL TRIFENATATE 100; 62.5; 25 UG/1; UG/1; UG/1
100-62.5-25 POWDER RESPIRATORY (INHALATION) DAILY
Qty: 1 | Refills: 2 | Status: COMPLETED | COMMUNITY
Start: 2020-04-08 | End: 2024-01-31

## 2024-01-31 NOTE — DISCUSSION/SUMMARY
[FreeTextEntry1] : She is a 63 year old, former smoker (stopped in 2000), with a history of hypertension, cardiomyopathy and severe COPD. CT 4/24/20 showed granulomatous disease with scarring and bronchiectasis possibly due to Sarcoid. CT Chest 1/28/23: Moderate pulmonary scarring and mild bronchiectasis.  Calcified granulomata and adenopathy.  Impression:  Severe COPD -Stable -On Breztri and albuterol History of Sarcoid - scarring on chest CT  Former smoker  Recommend Continue on Breztri and albuterol as needed  Continue with montelukast  Follow-up in 3 months  Cleared for ventral hernia repair from the pulmonary aspect

## 2024-01-31 NOTE — PROCEDURE
[FreeTextEntry1] : Cardiac cath 3/4/15: negative for CAD ([facility]). EF 55%.   Echocardiogram 3/27/15: Mild to moderate LV dysfunction. EF of 45%. Hypokinetic infero-lateral wall. Normal RV size and function.   PFT 11/27/18:  Mixed obstructive/restrictive lung disease with a bronchodilator response. Diffusion is decreased.  PFT 4/8/20:  Severe obstruction. Moderate restriction. Moderate reduction in diffusion. Mild response to bronchodilator.   PFT 8/4/22: Severe obstruction. Moderate restriction and moderate reduction in diffusion. No significant improvement after bronchodilator.   PFT 12/27/23: Severe obstruction.  Moderate restriction.  Moderate reduction in diffusion.  Chest x-ray 12/29/19: Done at [facility] [place]. Increased markings bilaterally. No effusion. Heart size was normal.  CT Chest 4/24/20: Granulomatous disease. Moderate pulmonary scatting with traction bronchiectasis. Hepatic granulomas as well.   CT Chest 1/28/23: Moderate pulmonary scarring and mild bronchiectasis.  Calcified granulomata and adenopathy.

## 2024-01-31 NOTE — PHYSICAL EXAM
[No Acute Distress] : no acute distress [Normal Rate/Rhythm] : normal rate/rhythm [Normal S1, S2] : normal s1, s2 [No Resp Distress] : no resp distress [Benign] : benign [No HSM] : no hsm [Normal Gait] : normal gait [No Cyanosis] : no cyanosis [No Edema] : no edema [Normal Turgor] : normal turgor [No Focal Deficits] : no focal deficits [Oriented x3] : oriented x3 [Normal Affect] : normal affect [Normal Appearance] : normal appearance [No Neck Mass] : no neck mass [Clear to Auscultation Bilaterally] : clear to auscultation bilaterally

## 2024-01-31 NOTE — REVIEW OF SYSTEMS
[Fever] : no fever [Fatigue] : no fatigue [Nasal Congestion] : no nasal congestion [Postnasal Drip] : no postnasal drip [Cough] : no cough [Hemoptysis] : no hemoptysis [Sputum] : no sputum [Pleuritic Pain] : no pleuritic pain [Wheezing] : no wheezing [Chest Discomfort] : no chest discomfort [Edema] : no edema [Palpitations] : no palpitations [Nasal Discharge] : no nasal discharge [GERD] : no gerd [Myalgias] : no myalgias [Rash] : no rash [Anemia] : no anemia [Headache] : no headache [Depression] : no depression [Diabetes] : no diabetes [Thyroid Problem] : no thyroid problem

## 2024-01-31 NOTE — HISTORY OF PRESENT ILLNESS
[Former] : former [Never] : never [TextBox_4] : She is feeling well.  She denied any cough, wheezing or shortness of breath.  She has been on Breztri and albuterol as needed.  She is physically active and able to walk without any dyspnea on exertion. No chest pain, pressure or palpitations. No fever, chills or sweats.   She is anticipating repair of a ventral hernia in a few weeks by Dr. Ortiz at [facility].  She has not smoked in over 20 years. [YearQuit] : 2000 [Awakes Unrefreshed] : does not awaken unrefreshed

## 2024-03-15 ENCOUNTER — LABORATORY RESULT (OUTPATIENT)
Age: 64
End: 2024-03-15

## 2024-03-15 ENCOUNTER — APPOINTMENT (OUTPATIENT)
Dept: PULMONOLOGY | Facility: CLINIC | Age: 64
End: 2024-03-15
Payer: MEDICARE

## 2024-03-15 VITALS — OXYGEN SATURATION: 89 % | BODY MASS INDEX: 24.87 KG/M2 | WEIGHT: 140.38 LBS | HEART RATE: 88 BPM

## 2024-03-15 VITALS — OXYGEN SATURATION: 93 %

## 2024-03-15 DIAGNOSIS — R06.2 WHEEZING: ICD-10-CM

## 2024-03-15 DIAGNOSIS — J47.9 BRONCHIECTASIS, UNCOMPLICATED: ICD-10-CM

## 2024-03-15 DIAGNOSIS — J98.4 EMPHYSEMA, UNSPECIFIED: ICD-10-CM

## 2024-03-15 DIAGNOSIS — J43.9 EMPHYSEMA, UNSPECIFIED: ICD-10-CM

## 2024-03-15 DIAGNOSIS — R05.9 COUGH, UNSPECIFIED: ICD-10-CM

## 2024-03-15 PROCEDURE — 99214 OFFICE O/P EST MOD 30 MIN: CPT | Mod: 25

## 2024-03-15 RX ORDER — DOXYCYCLINE 100 MG/1
100 CAPSULE ORAL
Qty: 14 | Refills: 0 | Status: ACTIVE | COMMUNITY
Start: 2024-03-15 | End: 1900-01-01

## 2024-03-15 RX ORDER — PREDNISONE 20 MG/1
20 TABLET ORAL DAILY
Qty: 10 | Refills: 1 | Status: ACTIVE | COMMUNITY
Start: 2024-03-15 | End: 1900-01-01

## 2024-03-15 NOTE — REVIEW OF SYSTEMS
[Fever] : no fever [Fatigue] : no fatigue [Nasal Congestion] : no nasal congestion [Postnasal Drip] : no postnasal drip [Cough] : cough [Hemoptysis] : no hemoptysis [Sputum] : sputum [Dyspnea] : no dyspnea [Wheezing] : no wheezing [Pleuritic Pain] : no pleuritic pain [Chest Discomfort] : no chest discomfort [Edema] : no edema [Palpitations] : no palpitations [Nasal Discharge] : no nasal discharge [GERD] : no gerd [Myalgias] : no myalgias [Rash] : no rash [Anemia] : no anemia [Headache] : no headache [Depression] : no depression [Diabetes] : no diabetes [Thyroid Problem] : no thyroid problem

## 2024-03-15 NOTE — PROCEDURE
[FreeTextEntry1] : Cardiac cath 3/4/15: negative for CAD ([facility]). EF 55%.   Echocardiogram 3/27/15: Mild to moderate LV dysfunction. EF of 45%. Hypokinetic infero-lateral wall. Normal RV size and function.   PFT 11/27/18:  Mixed obstructive/restrictive lung disease with a bronchodilator response. Diffusion is decreased.  PFT 4/8/20:  Severe obstruction. Moderate restriction. Moderate reduction in diffusion. Mild response to bronchodilator.   PFT 8/4/22: Severe obstruction. Moderate restriction and moderate reduction in diffusion. No significant improvement after bronchodilator.   PFT 12/27/23: Severe obstruction.  Moderate restriction.  Moderate reduction in diffusion.  Chest x-ray 12/29/19: Done at [facility] [place]. Increased markings bilaterally. No effusion. Heart size was normal.  CT Chest 4/24/20: Granulomatous disease. Moderate pulmonary scatting with traction bronchiectasis. Hepatic granulomas as well.   CT Chest 1/28/23: Moderate pulmonary scarring and mild bronchiectasis.  Calcified granulomata and adenopathy.

## 2024-03-15 NOTE — HISTORY OF PRESENT ILLNESS
[Former] : former [Never] : never [YearQuit] : 2000 [Awakes Unrefreshed] : does not awaken unrefreshed [TextBox_4] : Hernia surgery was canceled due to her wheezing.  She has been coughing and wheezing.  No chest pain.  No fever or chills.  No sick contacts.

## 2024-03-15 NOTE — DISCUSSION/SUMMARY
[FreeTextEntry1] : She is a 63 year old, former smoker (stopped in 2000), with a history of hypertension, cardiomyopathy and severe COPD. CT 4/24/20 showed granulomatous disease with scarring and bronchiectasis possibly due to Sarcoid. CT Chest 1/28/23: Moderate pulmonary scarring and mild bronchiectasis.  Calcified granulomata and adenopathy.  Impression Exacerbation of COPD -On Breztri and albuterol History of Sarcoid - scarring on chest CT  Former smoker  Recommend Prednisone 20 mg daily for 10 days Doxycycline for 7 days CT Chest Blood work obtained Continue Breztri, albuterol and montelukast Follow-up in 2 weeks

## 2024-03-15 NOTE — PHYSICAL EXAM
[No Acute Distress] : no acute distress [Normal Appearance] : normal appearance [No Neck Mass] : no neck mass [Normal Rate/Rhythm] : normal rate/rhythm [Normal S1, S2] : normal s1, s2 [No Resp Distress] : no resp distress [Benign] : benign [No HSM] : no hsm [Normal Gait] : normal gait [No Cyanosis] : no cyanosis [No Edema] : no edema [Normal Turgor] : normal turgor [No Focal Deficits] : no focal deficits [Oriented x3] : oriented x3 [Normal Affect] : normal affect [Wheeze] : wheeze [Rhonchi] : rhonchi [No Clubbing] : no clubbing

## 2024-03-18 LAB
A ALTERNATA IGE QN: <0.1 KUA/L
A FUMIGATUS IGE QN: <0.1 KUA/L
A1AT SERPL-MCNC: 160 MG/DL
C ALBICANS IGE QN: <0.1 KUA/L
C HERBARUM IGE QN: <0.1 KUA/L
CAT DANDER IGE QN: <0.1 KUA/L
COMMON RAGWEED IGE QN: <0.1 KUA/L
D FARINAE IGE QN: <0.1 KUA/L
D PTERONYSS IGE QN: <0.1 KUA/L
DEPRECATED A ALTERNATA IGE RAST QL: 0 (ref 0–?)
DEPRECATED A FUMIGATUS IGE RAST QL: 0 (ref 0–?)
DEPRECATED C ALBICANS IGE RAST QL: 0
DEPRECATED C HERBARUM IGE RAST QL: 0 (ref 0–?)
DEPRECATED CAT DANDER IGE RAST QL: 0 (ref 0–?)
DEPRECATED COMMON RAGWEED IGE RAST QL: 0 (ref 0–?)
DEPRECATED D FARINAE IGE RAST QL: 0 (ref 0–?)
DEPRECATED D PTERONYSS IGE RAST QL: 0 (ref 0–?)
DEPRECATED DOG DANDER IGE RAST QL: 0 (ref 0–?)
DEPRECATED KAPPA LC FREE/LAMBDA SER: 1.19 RATIO
DEPRECATED M RACEMOSUS IGE RAST QL: 0
DEPRECATED ROACH IGE RAST QL: 0 (ref 0–?)
DEPRECATED TIMOTHY IGE RAST QL: 0 (ref 0–?)
DEPRECATED WHITE OAK IGE RAST QL: 0 (ref 0–?)
DOG DANDER IGE QN: <0.1 KUA/L
EOSINOPHIL # BLD MANUAL: 510 /UL
IGA SER QL IEP: 132 MG/DL
IGG SER QL IEP: 775 MG/DL
IGM SER QL IEP: 125 MG/DL
KAPPA LC CSF-MCNC: 2.2 MG/DL
KAPPA LC SERPL-MCNC: 2.62 MG/DL
M RACEMOSUS IGE QN: <0.1 KUA/L
ROACH IGE QN: <0.1 KUA/L
TIMOTHY IGE QN: <0.1 KUA/L
TOTAL IGE SMQN RAST: 34 KU/L
WHITE OAK IGE QN: <0.1 KUA/L

## 2024-03-19 LAB
ALTERN TENCAPG(M6): <2 MCG/ML
ASPER FUMCAPG(M3): 11.7 MCG/ML
AUREOBASCAPG(M12): <2 MCG/ML
LACEYELLA SACCHARI IGG: 2.6 MCG/ML
MICROPOLYCAPG(M22): <2 MCG/ML
PENIC CHRYCAPG(M1): 9.5 MCG/ML
PHOMA BETAE IGG: 2.3 MCG/ML
TRICHODERMA VIRIDE IGG: 4 MCG/ML

## 2024-03-20 LAB
A FLAVUS AB FLD QL: NEGATIVE
A FUMIGATUS AB FLD QL: NEGATIVE
A NIGER AB FLD QL: NEGATIVE

## 2024-07-22 ENCOUNTER — APPOINTMENT (OUTPATIENT)
Dept: PULMONOLOGY | Facility: CLINIC | Age: 64
End: 2024-07-22

## 2024-07-29 ENCOUNTER — APPOINTMENT (OUTPATIENT)
Dept: PULMONOLOGY | Facility: CLINIC | Age: 64
End: 2024-07-29
Payer: MEDICARE

## 2024-07-29 VITALS
SYSTOLIC BLOOD PRESSURE: 167 MMHG | OXYGEN SATURATION: 97 % | TEMPERATURE: 96 F | HEART RATE: 77 BPM | DIASTOLIC BLOOD PRESSURE: 90 MMHG | WEIGHT: 141 LBS | BODY MASS INDEX: 24.98 KG/M2

## 2024-07-29 VITALS — DIASTOLIC BLOOD PRESSURE: 88 MMHG | SYSTOLIC BLOOD PRESSURE: 148 MMHG

## 2024-07-29 DIAGNOSIS — J44.9 CHRONIC OBSTRUCTIVE PULMONARY DISEASE, UNSPECIFIED: ICD-10-CM

## 2024-07-29 DIAGNOSIS — R93.89 ABNORMAL FINDINGS ON DIAGNOSTIC IMAGING OF OTHER SPECIFIED BODY STRUCTURES: ICD-10-CM

## 2024-07-29 DIAGNOSIS — J47.9 BRONCHIECTASIS, UNCOMPLICATED: ICD-10-CM

## 2024-07-29 PROCEDURE — 99214 OFFICE O/P EST MOD 30 MIN: CPT

## 2024-07-29 NOTE — PROCEDURE
[FreeTextEntry1] : Cardiac cath 3/4/15: negative for CAD ([facility]). EF 55%.   Echocardiogram 3/27/15: Mild to moderate LV dysfunction. EF of 45%. Hypokinetic infero-lateral wall. Normal RV size and function.   PFT 11/27/18:  Mixed obstructive/restrictive lung disease with a bronchodilator response. Diffusion is decreased.  PFT 4/8/20:  Severe obstruction. Moderate restriction. Moderate reduction in diffusion. Mild response to bronchodilator.   PFT 8/4/22: Severe obstruction. Moderate restriction and moderate reduction in diffusion. No significant improvement after bronchodilator.   PFT 12/27/23: Severe obstruction.  Moderate restriction.  Moderate reduction in diffusion.  [place]29[place]19[place] [facility] [place] bilaterally. No effusion. Heart size was normal.  CT Chest 4/24/20: Granulomatous disease. Moderate pulmonary scatting with traction bronchiectasis. Hepatic granulomas as well.   CT Chest 1/28/23: Moderate pulmonary scarring and mild bronchiectasis.  Calcified granulomata and adenopathy.  CT Chest 3/36/24: Pulmonary scarring, stable.  Bronchiectasis, stable.  Calcified adenopathy.

## 2024-07-29 NOTE — HISTORY OF PRESENT ILLNESS
[Former] : former [Never] : never [TextBox_4] : She has intermittent coughing.  No shortness of breath or wheezing at the present time.  She has been on Breztri.  She does not smoke. [YearQuit] : 2000 [Awakes Unrefreshed] : does not awaken unrefreshed [Difficulty Maintaining Sleep] : does not have difficulty maintaining sleep

## 2024-07-29 NOTE — DISCUSSION/SUMMARY
[FreeTextEntry1] : She is a 64 year old, former smoker (stopped in 2000), with a history of hypertension, cardiomyopathy and severe COPD.  Impression COPD -On Breztri and albuterol History of Sarcoid - scarring on chest CT  Former smoker  Recommend Continue with Breztri and albuterol as needed  Follow up in three months, sooner if necessary

## 2024-07-29 NOTE — PROCEDURE
[FreeTextEntry1] : Cardiac cath 3/4/15: negative for CAD ([facility]). EF 55%.   Echocardiogram 3/27/15: Mild to moderate LV dysfunction. EF of 45%. Hypokinetic infero-lateral wall. Normal RV size and function.   PFT 11/27/18:  Mixed obstructive/restrictive lung disease with a bronchodilator response. Diffusion is decreased.  PFT 4/8/20:  Severe obstruction. Moderate restriction. Moderate reduction in diffusion. Mild response to bronchodilator.   PFT 8/4/22: Severe obstruction. Moderate restriction and moderate reduction in diffusion. No significant improvement after bronchodilator.   PFT 12/27/23: Severe obstruction.  Moderate restriction.  Moderate reduction in diffusion.  Chest x-ray 12/29/19: Done at [facility] [place]. Increased markings bilaterally. No effusion. Heart size was normal.  CT Chest 4/24/20: Granulomatous disease. Moderate pulmonary scatting with traction bronchiectasis. Hepatic granulomas as well.   CT Chest 1/28/23: Moderate pulmonary scarring and mild bronchiectasis.  Calcified granulomata and adenopathy.  CT Chest 3/36/24: Pulmonary scarring, stable.  Bronchiectasis, stable.  Calcified adenopathy.

## 2024-07-29 NOTE — PHYSICAL EXAM
[No Acute Distress] : no acute distress [Normal Appearance] : normal appearance [Normal Rate/Rhythm] : normal rate/rhythm [Normal S1, S2] : normal s1, s2 [No Resp Distress] : no resp distress [Wheeze] : wheeze [Benign] : benign [No HSM] : no hsm [Normal Gait] : normal gait [No Clubbing] : no clubbing [No Cyanosis] : no cyanosis [No Edema] : no edema [Normal Turgor] : normal turgor [No Focal Deficits] : no focal deficits [Oriented x3] : oriented x3 [Normal Affect] : normal affect [Clear to Auscultation Bilaterally] : clear to auscultation bilaterally

## 2024-07-29 NOTE — REVIEW OF SYSTEMS
[Cough] : cough [Fatigue] : no fatigue [Nasal Congestion] : no nasal congestion [Postnasal Drip] : no postnasal drip [Hemoptysis] : no hemoptysis [Chest Tightness] : no chest tightness [Sputum] : no sputum [Dyspnea] : no dyspnea [Wheezing] : no wheezing [Chest Discomfort] : no chest discomfort [Edema] : no edema [Palpitations] : no palpitations [Nasal Discharge] : no nasal discharge [GERD] : no gerd [Myalgias] : no myalgias [Rash] : no rash [Anemia] : no anemia [Headache] : no headache [Depression] : no depression [Diabetes] : no diabetes [Thyroid Problem] : no thyroid problem [Obesity] : no obesity

## 2025-02-12 ENCOUNTER — APPOINTMENT (OUTPATIENT)
Dept: PULMONOLOGY | Facility: CLINIC | Age: 65
End: 2025-02-12

## 2025-02-13 ENCOUNTER — APPOINTMENT (OUTPATIENT)
Dept: PULMONOLOGY | Facility: CLINIC | Age: 65
End: 2025-02-13
Payer: MEDICARE

## 2025-02-13 ENCOUNTER — NON-APPOINTMENT (OUTPATIENT)
Age: 65
End: 2025-02-13

## 2025-02-13 VITALS
SYSTOLIC BLOOD PRESSURE: 144 MMHG | WEIGHT: 149 LBS | HEART RATE: 96 BPM | OXYGEN SATURATION: 95 % | TEMPERATURE: 97.6 F | DIASTOLIC BLOOD PRESSURE: 84 MMHG | BODY MASS INDEX: 26.39 KG/M2

## 2025-02-13 DIAGNOSIS — R05.9 COUGH, UNSPECIFIED: ICD-10-CM

## 2025-02-13 DIAGNOSIS — R94.2 ABNORMAL RESULTS OF PULMONARY FUNCTION STUDIES: ICD-10-CM

## 2025-02-13 DIAGNOSIS — R06.02 SHORTNESS OF BREATH: ICD-10-CM

## 2025-02-13 DIAGNOSIS — J44.9 CHRONIC OBSTRUCTIVE PULMONARY DISEASE, UNSPECIFIED: ICD-10-CM

## 2025-02-13 PROCEDURE — 94060 EVALUATION OF WHEEZING: CPT

## 2025-02-13 PROCEDURE — 99214 OFFICE O/P EST MOD 30 MIN: CPT | Mod: 25

## 2025-02-13 RX ORDER — PANTOPRAZOLE 40 MG/1
40 TABLET, DELAYED RELEASE ORAL
Refills: 0 | Status: ACTIVE | COMMUNITY

## 2025-02-13 RX ORDER — FUROSEMIDE 20 MG/1
20 TABLET ORAL
Refills: 0 | Status: ACTIVE | COMMUNITY

## 2025-05-14 ENCOUNTER — APPOINTMENT (OUTPATIENT)
Dept: PULMONOLOGY | Facility: CLINIC | Age: 65
End: 2025-05-14

## 2025-06-30 ENCOUNTER — APPOINTMENT (OUTPATIENT)
Dept: PULMONOLOGY | Facility: CLINIC | Age: 65
End: 2025-06-30

## 2025-09-11 ENCOUNTER — APPOINTMENT (OUTPATIENT)
Dept: PULMONOLOGY | Facility: CLINIC | Age: 65
End: 2025-09-11
Payer: MEDICARE

## 2025-09-11 VITALS
BODY MASS INDEX: 25.34 KG/M2 | SYSTOLIC BLOOD PRESSURE: 144 MMHG | OXYGEN SATURATION: 96 % | DIASTOLIC BLOOD PRESSURE: 78 MMHG | WEIGHT: 143 LBS | TEMPERATURE: 97.1 F | HEIGHT: 63 IN | HEART RATE: 82 BPM

## 2025-09-11 DIAGNOSIS — J98.4 CHRONIC OBSTRUCTIVE PULMONARY DISEASE, UNSPECIFIED: ICD-10-CM

## 2025-09-11 DIAGNOSIS — Z11.59 ENCOUNTER FOR SCREENING FOR OTHER VIRAL DISEASES: ICD-10-CM

## 2025-09-11 DIAGNOSIS — J44.9 CHRONIC OBSTRUCTIVE PULMONARY DISEASE, UNSPECIFIED: ICD-10-CM

## 2025-09-11 DIAGNOSIS — R93.89 ABNORMAL FINDINGS ON DIAGNOSTIC IMAGING OF OTHER SPECIFIED BODY STRUCTURES: ICD-10-CM

## 2025-09-11 DIAGNOSIS — R05.9 COUGH, UNSPECIFIED: ICD-10-CM

## 2025-09-11 PROCEDURE — 94060 EVALUATION OF WHEEZING: CPT

## 2025-09-11 PROCEDURE — 99215 OFFICE O/P EST HI 40 MIN: CPT | Mod: 25
